# Patient Record
Sex: FEMALE | URBAN - METROPOLITAN AREA
[De-identification: names, ages, dates, MRNs, and addresses within clinical notes are randomized per-mention and may not be internally consistent; named-entity substitution may affect disease eponyms.]

---

## 2020-10-08 ENCOUNTER — TRANSCRIBE ORDER (OUTPATIENT)
Dept: SCHEDULING | Age: 45
End: 2020-10-08

## 2020-10-08 DIAGNOSIS — Z12.31 VISIT FOR SCREENING MAMMOGRAM: Primary | ICD-10-CM

## 2023-05-22 ENCOUNTER — OFFICE VISIT (OUTPATIENT)
Age: 48
End: 2023-05-22
Payer: MEDICARE

## 2023-05-22 VITALS
TEMPERATURE: 98 F | DIASTOLIC BLOOD PRESSURE: 79 MMHG | BODY MASS INDEX: 24.98 KG/M2 | HEART RATE: 68 BPM | HEIGHT: 63 IN | OXYGEN SATURATION: 100 % | WEIGHT: 141 LBS | SYSTOLIC BLOOD PRESSURE: 126 MMHG

## 2023-05-22 DIAGNOSIS — M54.16 LEFT LUMBAR RADICULOPATHY: ICD-10-CM

## 2023-05-22 DIAGNOSIS — M54.16 LEFT LUMBAR RADICULOPATHY: Primary | ICD-10-CM

## 2023-05-22 PROCEDURE — 99204 OFFICE O/P NEW MOD 45 MIN: CPT | Performed by: PHYSICAL MEDICINE & REHABILITATION

## 2023-05-22 RX ORDER — METOPROLOL TARTRATE 50 MG/1
50 TABLET, FILM COATED ORAL 2 TIMES DAILY
COMMUNITY

## 2023-05-22 RX ORDER — PREGABALIN 50 MG/1
50 CAPSULE ORAL
Qty: 30 CAPSULE | Refills: 0 | Status: SHIPPED | OUTPATIENT
Start: 2023-05-22 | End: 2023-06-21

## 2023-05-22 RX ORDER — AMLODIPINE BESYLATE AND ATORVASTATIN CALCIUM 10; 20 MG/1; MG/1
1 TABLET, FILM COATED ORAL DAILY
COMMUNITY

## 2023-05-22 RX ORDER — AMITRIPTYLINE HYDROCHLORIDE 10 MG/1
10 TABLET, FILM COATED ORAL NIGHTLY
COMMUNITY

## 2023-05-22 NOTE — PROGRESS NOTES
[address]  [address], [address]  [address]  Phone: [phone]  Fax: [phone]      Patient: Marian Garcia                                                                              MRN: [id number]        YOB: 1975          AGE: 56 y.o. PCP: PROVIDER MANNIE Pederson  Date:  05/22/23    Reason for Consultation: New Patient, Back Pain, and Transient Ischemic Attack      HPI:  Marian Garcia is a 56 y.o. female with relevant PMH of HTN who presents with left low back pain radiating down the left leg. The pain began 1 months prior. Began after she tripped over her dog.  5/15/23 she went to the ED with numbness tingling in the left leg and  left arm and had to undergo a stroke work up which was negative. 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 [facility]      Neurologic symptoms: No numbness, tingling, weakness, bowel or bladder changes. No recent falls      Location: The pain is located in the low back pain    Radiation: The pain does radiate down the left leg . Pain Score: Currently: 10/10   Quality: Pain is of a aching, burning, tingling, pins and needles, tight, pulling, quality. Aggravating: Pain is exacerbated by walking, sitting, standing, lying down, and exercise  Alleviating: The pain is alleviated by nothing    Prior Treatments:  Physical therapy: Yes- referred has not started   Injections:No  Surgery:No  Previous Medications: flexeril , tizanidine , elavil 10mg- not sure if she has tried   Current Medications:  Previous work-up has included:   [facility] - slight L5/S1 disc space narrowing   Past Medical History: No past medical history on file. Past Surgical History: No past surgical history on file.    SocHx:   Social History     Tobacco Use    Smoking status: Not on file    Smokeless tobacco: Not

## 2023-05-22 NOTE — PROGRESS NOTES
Lilly Cardona presents today for   Chief Complaint   Patient presents with    New Patient    Back Pain    Transient Ischemic Attack       Is someone accompanying this pt? no    Is the patient using any DME equipment during OV? no    Coordination of Care:  1. Have you been to the ER, urgent care clinic since your last visit? no  Hospitalized since your last visit? Yes, TIA    2. Have you seen or consulted any other health care providers outside of the [address] [place] [address] since your last visit? no Include any pap smears or colon screening.  no

## 2023-06-06 ENCOUNTER — TELEPHONE (OUTPATIENT)
Age: 48
End: 2023-06-06

## 2023-06-06 NOTE — TELEPHONE ENCOUNTER
Lvm for pt. To see if she wanted to do a VV with provider to discuss MRI for something sooner. Okay to double book per the provider for VV.

## 2023-06-08 ENCOUNTER — TELEMEDICINE (OUTPATIENT)
Age: 48
End: 2023-06-08
Payer: MEDICARE

## 2023-06-08 DIAGNOSIS — M54.16 LEFT LUMBAR RADICULOPATHY: Primary | ICD-10-CM

## 2023-06-08 PROCEDURE — 99442 PR PHYS/QHP TELEPHONE EVALUATION 11-20 MIN: CPT | Performed by: PHYSICAL MEDICINE & REHABILITATION

## 2023-06-08 RX ORDER — PREGABALIN 100 MG/1
100 CAPSULE ORAL
Qty: 30 CAPSULE | Refills: 0 | Status: SHIPPED | OUTPATIENT
Start: 2023-06-08 | End: 2023-07-08

## 2023-06-08 NOTE — PROGRESS NOTES
[facility] Deepali Pulido was evaluated through a synchronous (real-time) audio encounter. Patient identification was verified at the start of the visit. She (or guardian if applicable) is aware that this is a billable service, which includes applicable co-pays. This visit was conducted with the patient's (and/or legal guardian's) verbal consent. She has not had a related appointment within my department in the past 7 days or scheduled within the next 24 hours. The patient was located at Home: [place] [id number]. The provider was located at [facility] (Appt Dept): [address] [place] [address]. [place] [address].     Note: not billable if this call serves to triage the patient into an appointment for the relevant concern    Shraddha Parikh MD

## 2023-07-06 ENCOUNTER — HOSPITAL ENCOUNTER (OUTPATIENT)
Facility: HOSPITAL | Age: 48
End: 2023-07-06
Payer: MEDICARE

## 2023-07-06 ENCOUNTER — HOSPITAL ENCOUNTER (OUTPATIENT)
Facility: HOSPITAL | Age: 48
Discharge: HOME | End: 2023-07-06
Payer: MEDICARE

## 2023-07-06 VITALS
TEMPERATURE: 98.6 F | RESPIRATION RATE: 12 BRPM | OXYGEN SATURATION: 93 % | HEART RATE: 63 BPM | DIASTOLIC BLOOD PRESSURE: 88 MMHG | SYSTOLIC BLOOD PRESSURE: 156 MMHG

## 2023-07-06 DIAGNOSIS — G89.29 CHRONIC LOW BACK PAIN, UNSPECIFIED BACK PAIN LATERALITY, UNSPECIFIED WHETHER SCIATICA PRESENT: Primary | ICD-10-CM

## 2023-07-06 DIAGNOSIS — M54.50 CHRONIC LOW BACK PAIN, UNSPECIFIED BACK PAIN LATERALITY, UNSPECIFIED WHETHER SCIATICA PRESENT: Primary | ICD-10-CM

## 2023-07-06 PROBLEM — M54.16 LUMBAR RADICULOPATHY: Status: ACTIVE | Noted: 2023-07-06

## 2023-07-06 LAB — HCG UR QL: NEGATIVE

## 2023-07-06 PROCEDURE — 81025 URINE PREGNANCY TEST: CPT

## 2023-07-06 PROCEDURE — 64483 NJX AA&/STRD TFRM EPI L/S 1: CPT

## 2023-07-06 PROCEDURE — 64484 NJX AA&/STRD TFRM EPI L/S EA: CPT

## 2023-07-06 PROCEDURE — 6360000004 HC RX CONTRAST MEDICATION: Performed by: PHYSICAL MEDICINE & REHABILITATION

## 2023-07-06 PROCEDURE — 64484 NJX AA&/STRD TFRM EPI L/S EA: CPT | Performed by: PHYSICAL MEDICINE & REHABILITATION

## 2023-07-06 PROCEDURE — 6370000000 HC RX 637 (ALT 250 FOR IP): Performed by: PHYSICAL MEDICINE & REHABILITATION

## 2023-07-06 PROCEDURE — 6360000002 HC RX W HCPCS: Performed by: PHYSICAL MEDICINE & REHABILITATION

## 2023-07-06 PROCEDURE — 64483 NJX AA&/STRD TFRM EPI L/S 1: CPT | Performed by: PHYSICAL MEDICINE & REHABILITATION

## 2023-07-06 PROCEDURE — 2500000003 HC RX 250 WO HCPCS: Performed by: PHYSICAL MEDICINE & REHABILITATION

## 2023-07-06 RX ORDER — LIDOCAINE HYDROCHLORIDE 10 MG/ML
30 INJECTION, SOLUTION EPIDURAL; INFILTRATION; INTRACAUDAL; PERINEURAL ONCE
Status: COMPLETED | OUTPATIENT
Start: 2023-07-06 | End: 2023-07-06

## 2023-07-06 RX ORDER — DIAZEPAM 5 MG/1
10 TABLET ORAL ONCE
Status: COMPLETED | OUTPATIENT
Start: 2023-07-06 | End: 2023-07-06

## 2023-07-06 RX ORDER — METOPROLOL SUCCINATE 50 MG/1
50 TABLET, EXTENDED RELEASE ORAL DAILY
COMMUNITY
Start: 2023-06-23 | End: 2023-07-06

## 2023-07-06 RX ORDER — DIAZEPAM 5 MG/1
5 TABLET ORAL ONCE
Status: COMPLETED | OUTPATIENT
Start: 2023-07-06 | End: 2023-07-06

## 2023-07-06 RX ORDER — DIAZEPAM 5 MG/1
2.5 TABLET ORAL ONCE
Status: COMPLETED | OUTPATIENT
Start: 2023-07-06 | End: 2023-07-06

## 2023-07-06 RX ORDER — FAMOTIDINE 40 MG/1
40 TABLET, FILM COATED ORAL 2 TIMES DAILY
COMMUNITY
Start: 2023-06-23 | End: 2023-07-06

## 2023-07-06 RX ORDER — DEXAMETHASONE SODIUM PHOSPHATE 10 MG/ML
10 INJECTION, SOLUTION INTRAMUSCULAR; INTRAVENOUS ONCE
Status: COMPLETED | OUTPATIENT
Start: 2023-07-06 | End: 2023-07-06

## 2023-07-06 RX ORDER — RIZATRIPTAN BENZOATE 10 MG/1
10 TABLET ORAL 2 TIMES DAILY
COMMUNITY
Start: 2023-06-23

## 2023-07-06 RX ORDER — AMLODIPINE BESYLATE 5 MG/1
5 TABLET ORAL DAILY
COMMUNITY
Start: 2023-05-11 | End: 2023-07-06

## 2023-07-06 RX ADMIN — IOPAMIDOL 1 ML: 408 INJECTION, SOLUTION INTRATHECAL at 13:29

## 2023-07-06 RX ADMIN — LIDOCAINE HYDROCHLORIDE 10 ML: 10 INJECTION, SOLUTION EPIDURAL; INFILTRATION; INTRACAUDAL; PERINEURAL at 13:28

## 2023-07-06 RX ADMIN — DIAZEPAM 5 MG: 5 TABLET ORAL at 12:47

## 2023-07-06 RX ADMIN — DEXAMETHASONE SODIUM PHOSPHATE 10 MG: 10 INJECTION, SOLUTION INTRAMUSCULAR; INTRAVENOUS at 13:31

## 2023-07-06 ASSESSMENT — PAIN - FUNCTIONAL ASSESSMENT
PAIN_FUNCTIONAL_ASSESSMENT: 0-10
PAIN_FUNCTIONAL_ASSESSMENT: 0-10

## 2023-07-06 NOTE — OP NOTE
PROCEDURE NOTE      Patient Name: Helena Henry    Date of Procedure: July 6, 2023    Preoperative Diagnosis:  M54.16    Post Operative Diagnosis:  M54.16    Procedure :    left L5 Selective Nerve Root Block  left S1 Selective Nerve Root Block      Consent:  Informed consent was obtained prior to the procedure. The patient was given the opportunity to ask questions regarding the procedure and its associated risks. In addition to the potential risks associated with the procedure itself, the patient was informed both verbally and in writing of the potential side effects of the use of glucocorticoid. The patient appeared to comprehend the informed consent and desired to have the procedure performed. Procedure: The patient was placed in the prone position on the fluoroscopy table and the back was prepped and draped in the usual sterile manner. The exact spinal level was  identified using fluoroscopy, and Lidocaine 1 % was injected locally, a # 22 gauge spinal needle was passed to the transverse process. The depth was noted and the needle redirected to pass inferior and approximately one cm anterior to the transverse process. Yes  1 cc of Isovue M-200 was used to verify positioning in the epidural and paravertebral space and outlined the course of the spinal nerve into the epidural space. The same procedure was repeated at each spinal level indicated above. A total of 10 mg of preservative free Dexamethasone and 2 cc of Lidocaine was slowly injected. The patient tolerated the procedure well. The injection area was cleaned and bandaids applied. Not excessive bleeding was noted. Patient dressed and discharged to home with instructions. Discussion: The patient tolerated the procedure well.                                               Sourav Jolley MD  July 6, 2023

## 2023-07-07 ENCOUNTER — TELEPHONE (OUTPATIENT)
Age: 48
End: 2023-07-07

## 2023-07-07 DIAGNOSIS — M54.16 LEFT LUMBAR RADICULOPATHY: ICD-10-CM

## 2023-07-07 RX ORDER — PREGABALIN 100 MG/1
100 CAPSULE ORAL
Qty: 30 CAPSULE | Refills: 0 | Status: SHIPPED | OUTPATIENT
Start: 2023-07-07 | End: 2023-08-06

## 2023-07-07 NOTE — TELEPHONE ENCOUNTER
Patient is requesting a refill of her medication Lyrica. She uses [facility] pharmacy at [address]

## 2023-07-07 NOTE — TELEPHONE ENCOUNTER
Called patient [phone] and left voice mail asking her to call the office back. I was calling to inform her that the provider has sent in a the Lyrica 100 mg take one pill at night to her [facility] pharmacy. No further action needed at this time.

## 2023-07-27 ENCOUNTER — OFFICE VISIT (OUTPATIENT)
Age: 48
End: 2023-07-27
Payer: MEDICARE

## 2023-07-27 VITALS
BODY MASS INDEX: 25.59 KG/M2 | TEMPERATURE: 98 F | HEART RATE: 60 BPM | OXYGEN SATURATION: 99 % | WEIGHT: 144.4 LBS | HEIGHT: 63 IN

## 2023-07-27 DIAGNOSIS — M54.2 NECK PAIN: Primary | ICD-10-CM

## 2023-07-27 DIAGNOSIS — M54.16 LEFT LUMBAR RADICULOPATHY: ICD-10-CM

## 2023-07-27 PROCEDURE — 99214 OFFICE O/P EST MOD 30 MIN: CPT | Performed by: PHYSICAL MEDICINE & REHABILITATION

## 2023-07-27 RX ORDER — PREGABALIN 100 MG/1
100 CAPSULE ORAL
Qty: 90 CAPSULE | Refills: 0 | Status: SHIPPED | OUTPATIENT
Start: 2023-07-27 | End: 2023-10-25

## 2023-07-27 ASSESSMENT — PATIENT HEALTH QUESTIONNAIRE - PHQ9
2. FEELING DOWN, DEPRESSED OR HOPELESS: 0
1. LITTLE INTEREST OR PLEASURE IN DOING THINGS: 0
SUM OF ALL RESPONSES TO PHQ QUESTIONS 1-9: 0
SUM OF ALL RESPONSES TO PHQ QUESTIONS 1-9: 0
SUM OF ALL RESPONSES TO PHQ9 QUESTIONS 1 & 2: 0
SUM OF ALL RESPONSES TO PHQ QUESTIONS 1-9: 0
SUM OF ALL RESPONSES TO PHQ QUESTIONS 1-9: 0

## 2023-07-27 NOTE — PROGRESS NOTES
Konstantin Delacruz presents today for   Chief Complaint   Patient presents with    Back Pain       Is someone accompanying this pt? no    Is the patient using any DME equipment during OV? no    Depression Screening:  No flowsheet data found. Learning Assessment:  No flowsheet data found. Abuse Screening:  No flowsheet data found. Fall Risk  No flowsheet data found. OPIOID RISK TOOL  No flowsheet data found. Coordination of Care:  1. Have you been to the ER, urgent care clinic since your last visit? no  Hospitalized since your last visit? no    2. Have you seen or consulted any other health care providers outside of the [address] [place] [address] since your last visit? no Include any pap smears or colon screening.  no
Facility (Appt Dept): [facility], [address],  [address] [place] [address].     Note: not billable if this call serves to triage the patient into an appointment for the relevant concern    Owen Jenkins MD

## 2023-10-26 ENCOUNTER — OFFICE VISIT (OUTPATIENT)
Age: 48
End: 2023-10-26
Payer: MEDICARE

## 2023-10-26 VITALS — BODY MASS INDEX: 25.59 KG/M2 | HEIGHT: 63 IN | OXYGEN SATURATION: 98 % | WEIGHT: 144.4 LBS | HEART RATE: 75 BPM

## 2023-10-26 DIAGNOSIS — M54.16 LEFT LUMBAR RADICULOPATHY: Primary | ICD-10-CM

## 2023-10-26 PROCEDURE — 99214 OFFICE O/P EST MOD 30 MIN: CPT | Performed by: PHYSICAL MEDICINE & REHABILITATION

## 2023-10-26 RX ORDER — CHOLECALCIFEROL (VITAMIN D3) 125 MCG
500 CAPSULE ORAL
COMMUNITY
Start: 2023-10-11

## 2023-10-26 RX ORDER — PREGABALIN 100 MG/1
100 CAPSULE ORAL
Qty: 90 CAPSULE | Refills: 0 | Status: SHIPPED | OUTPATIENT
Start: 2023-10-26 | End: 2024-01-24

## 2023-10-26 RX ORDER — METOPROLOL SUCCINATE 50 MG/1
50 TABLET, EXTENDED RELEASE ORAL DAILY
COMMUNITY
Start: 2023-09-12

## 2023-10-26 RX ORDER — AMLODIPINE BESYLATE 5 MG/1
5 TABLET ORAL DAILY
COMMUNITY
Start: 2023-08-11

## 2023-10-26 ASSESSMENT — PATIENT HEALTH QUESTIONNAIRE - PHQ9
1. LITTLE INTEREST OR PLEASURE IN DOING THINGS: 0
SUM OF ALL RESPONSES TO PHQ QUESTIONS 1-9: 0
SUM OF ALL RESPONSES TO PHQ QUESTIONS 1-9: 0
2. FEELING DOWN, DEPRESSED OR HOPELESS: 0
SUM OF ALL RESPONSES TO PHQ9 QUESTIONS 1 & 2: 0
SUM OF ALL RESPONSES TO PHQ QUESTIONS 1-9: 0
SUM OF ALL RESPONSES TO PHQ QUESTIONS 1-9: 0

## 2023-10-26 NOTE — PROGRESS NOTES
[facility]  [address] [place] [address] S, [address] [place] [address]   Phone: [phone]  Fax: [phone]      Patient: Shiela Hayes                                                                              MRN: [id number]        YOB: 1975          AGE: 56 y.o. PCP: Unknown, Provider  Date:  10/26/23    Reason for Consultation back pain radiating down the left leg      HPI:  Shiela Hayes is a 56 y.o. female with relevant PMH of HTN who presented with left low back pain radiating down the left leg. She reports chronic low back pain,and she developed pain radiating down her left leg she tripped over her dog.  5/15/23 she went to the ED with numbness tingling in the left leg and  left arm and had to undergo a stroke work up which was negative. 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 MRI at [facility] 6/1/23  diagnostic demonstrates large left disc extrusion displacing the left S1 nerve root. She tried  left L5 and S1 SNRB 7/6/2023 which has helped tremendously. She is currently in PT. Some pain has started to return radiating down the left leg  Neurologic symptoms: + numbness, tingling- left leg, weakness, bowel or bladder changes. No recent falls      Location: The pain is located in the low back pain  , neck pain   Radiation: The pain does radiate down the left leg . Pain Score: Currently: 4/10   Quality: Pain is of a aching, burning, tingling, pins and needles, tight, pulling, quality. Aggravating: Pain is exacerbated by walking, sitting, standing, lying down, and exercise  Alleviating:  The pain is alleviated by nothing    Prior Treatments:  Physical therapy: Yes [facility] [address]  Injections:  7/6/2023- left l5, S1 SNRB- great relief - 80% relief   Surgery:No  Previous Medications: flexeril , tizanidine , elavil 10mg- not sure if she has tried   Current

## 2023-10-26 NOTE — PROGRESS NOTES
Jhoan Carmichael presents today for   Chief Complaint   Patient presents with    Back Pain       Is someone accompanying this pt? no    Is the patient using any DME equipment during OV? no    Depression Screening:       No data to display                Learning Assessment:  No flowsheet data found. Abuse Screening:       No data to display                Fall Risk  No flowsheet data found. OPIOID RISK TOOL  No flowsheet data found. Coordination of Care:  1. Have you been to the ER, urgent care clinic since your last visit? Yes covid 3 weeks ago  Hospitalized since your last visit? no    2. Have you seen or consulted any other health care providers outside of the [address] [place] [address] since your last visit? no Include any pap smears or colon screening.  no

## 2023-11-02 ENCOUNTER — HOSPITAL ENCOUNTER (OUTPATIENT)
Facility: HOSPITAL | Age: 48
Discharge: HOME | End: 2023-11-02
Payer: MEDICAID

## 2023-11-02 ENCOUNTER — HOSPITAL ENCOUNTER (OUTPATIENT)
Facility: HOSPITAL | Age: 48
End: 2023-11-02
Payer: MEDICAID

## 2023-11-02 VITALS
DIASTOLIC BLOOD PRESSURE: 91 MMHG | HEART RATE: 65 BPM | OXYGEN SATURATION: 95 % | TEMPERATURE: 97.4 F | SYSTOLIC BLOOD PRESSURE: 122 MMHG | RESPIRATION RATE: 16 BRPM

## 2023-11-02 DIAGNOSIS — Z01.818 PREOP TESTING: Primary | ICD-10-CM

## 2023-11-02 LAB — HCG UR QL: NEGATIVE

## 2023-11-02 PROCEDURE — 64483 NJX AA&/STRD TFRM EPI L/S 1: CPT | Performed by: PHYSICAL MEDICINE & REHABILITATION

## 2023-11-02 PROCEDURE — 6370000000 HC RX 637 (ALT 250 FOR IP): Performed by: PHYSICAL MEDICINE & REHABILITATION

## 2023-11-02 PROCEDURE — 64483 NJX AA&/STRD TFRM EPI L/S 1: CPT

## 2023-11-02 PROCEDURE — 6360000002 HC RX W HCPCS: Performed by: PHYSICAL MEDICINE & REHABILITATION

## 2023-11-02 PROCEDURE — 81025 URINE PREGNANCY TEST: CPT

## 2023-11-02 PROCEDURE — 64484 NJX AA&/STRD TFRM EPI L/S EA: CPT

## 2023-11-02 PROCEDURE — 64484 NJX AA&/STRD TFRM EPI L/S EA: CPT | Performed by: PHYSICAL MEDICINE & REHABILITATION

## 2023-11-02 PROCEDURE — 6360000004 HC RX CONTRAST MEDICATION: Performed by: PHYSICAL MEDICINE & REHABILITATION

## 2023-11-02 PROCEDURE — 2500000003 HC RX 250 WO HCPCS: Performed by: PHYSICAL MEDICINE & REHABILITATION

## 2023-11-02 RX ORDER — DIAZEPAM 5 MG/1
2.5 TABLET ORAL ONCE
Status: COMPLETED | OUTPATIENT
Start: 2023-11-02 | End: 2023-11-02

## 2023-11-02 RX ORDER — DEXAMETHASONE SODIUM PHOSPHATE 10 MG/ML
10 INJECTION, SOLUTION INTRAMUSCULAR; INTRAVENOUS ONCE
Status: COMPLETED | OUTPATIENT
Start: 2023-11-02 | End: 2023-11-02

## 2023-11-02 RX ORDER — DIAZEPAM 5 MG/1
10 TABLET ORAL ONCE
Status: COMPLETED | OUTPATIENT
Start: 2023-11-02 | End: 2023-11-02

## 2023-11-02 RX ORDER — IOPAMIDOL 408 MG/ML
4 INJECTION, SOLUTION INTRATHECAL
Status: COMPLETED | OUTPATIENT
Start: 2023-11-02 | End: 2023-11-02

## 2023-11-02 RX ORDER — DIAZEPAM 5 MG/1
5 TABLET ORAL ONCE
Status: COMPLETED | OUTPATIENT
Start: 2023-11-02 | End: 2023-11-02

## 2023-11-02 RX ORDER — LIDOCAINE HYDROCHLORIDE 10 MG/ML
30 INJECTION, SOLUTION EPIDURAL; INFILTRATION; INTRACAUDAL; PERINEURAL ONCE
Status: COMPLETED | OUTPATIENT
Start: 2023-11-02 | End: 2023-11-02

## 2023-11-02 RX ADMIN — IOPAMIDOL 1 ML: 408 INJECTION, SOLUTION INTRATHECAL at 14:03

## 2023-11-02 RX ADMIN — DEXAMETHASONE SODIUM PHOSPHATE 10 MG: 10 INJECTION, SOLUTION INTRAMUSCULAR; INTRAVENOUS at 14:04

## 2023-11-02 RX ADMIN — LIDOCAINE HYDROCHLORIDE 12 ML: 10 INJECTION, SOLUTION EPIDURAL; INFILTRATION; INTRACAUDAL; PERINEURAL at 14:02

## 2023-11-02 RX ADMIN — DIAZEPAM 5 MG: 5 TABLET ORAL at 13:24

## 2023-11-02 ASSESSMENT — PAIN SCALES - GENERAL: PAINLEVEL_OUTOF10: 2

## 2023-11-02 ASSESSMENT — PAIN - FUNCTIONAL ASSESSMENT: PAIN_FUNCTIONAL_ASSESSMENT: 0-10

## 2023-11-02 NOTE — OP NOTE
PROCEDURE NOTE      Patient Name: Shiela Hayes    Date of Procedure: November 2, 2023    Preoperative Diagnosis:  M54.16    Post Operative Diagnosis:  same  Procedure :    left L5 Selective Nerve Root Block  left S1 Selective Nerve Root Block      Consent:  Informed consent was obtained prior to the procedure. The patient was given the opportunity to ask questions regarding the procedure and its associated risks. In addition to the potential risks associated with the procedure itself, the patient was informed both verbally and in writing of the potential side effects of the use of glucocorticoid. The patient appeared to comprehend the informed consent and desired to have the procedure performed. Procedure: The patient was placed in the prone position on the fluoroscopy table and the back was prepped and draped in the usual sterile manner. The exact spinal level was  identified using fluoroscopy, and Lidocaine 1 % was injected locally, a # 22 gauge spinal needle was passed to the transverse process. The depth was noted and the needle redirected to pass inferior and approximately one cm anterior to the transverse process. Yes  1 cc of Isovue M-200 was used to verify positioning in the epidural and paravertebral space and outlined the course of the spinal nerve into the epidural space. The same procedure was repeated at each spinal level indicated above. A total of 10 mg of preservative free Dexamethasone and 2 cc of Lidocaine was slowly injected. The patient tolerated the procedure well. The injection area was cleaned and bandaids applied. Not excessive bleeding was noted. Patient dressed and discharged to home with instructions. Discussion: The patient tolerated the procedure well.                                               Cosme Yusuf MD  November 2, 2023

## 2023-12-02 PROBLEM — Z01.818 PREOP TESTING: Status: RESOLVED | Noted: 2023-11-02 | Resolved: 2023-12-02

## 2024-01-18 ENCOUNTER — TELEPHONE (OUTPATIENT)
Age: 49
End: 2024-01-18

## 2024-01-18 NOTE — TELEPHONE ENCOUNTER
The issue with her foot is likely coming from the pinched nerve in her lumbar spine causing weakness in her foot.  Has she seen Dr. Syed yet?

## 2024-01-18 NOTE — TELEPHONE ENCOUNTER
Patient says her physical therapist is recommending another MRI of her foot and was told to call us and request the same.  Please review to determine if we can order this now or does patient need to be seen first.  Last MRI was for lumbar in 5/2023.

## 2024-01-19 NOTE — TELEPHONE ENCOUNTER
Called patient identified by two verifiers. Explained The issue with her foot is likely coming from the pinched nerve in her lumbar spine causing weakness in her foot. Has she seen Dr. Syed yet? Patient stated no she hasn't she works now and she doesn't want surgery. She stated she had conversation with provider and she would like another MRI for comparison. Please advise

## 2024-01-24 ENCOUNTER — TELEMEDICINE (OUTPATIENT)
Age: 49
End: 2024-01-24
Payer: MEDICAID

## 2024-01-24 DIAGNOSIS — M54.16 LEFT LUMBAR RADICULOPATHY: Primary | ICD-10-CM

## 2024-01-24 PROCEDURE — 99442 PR PHYS/QHP TELEPHONE EVALUATION 11-20 MIN: CPT | Performed by: PHYSICAL MEDICINE & REHABILITATION

## 2024-01-24 RX ORDER — ATORVASTATIN CALCIUM 10 MG/1
10 TABLET, FILM COATED ORAL DAILY
COMMUNITY
Start: 2023-11-10

## 2024-01-24 RX ORDER — IBUPROFEN 800 MG/1
TABLET ORAL
COMMUNITY
Start: 2024-01-09

## 2024-01-24 RX ORDER — AMLODIPINE BESYLATE 10 MG/1
10 TABLET ORAL
COMMUNITY
Start: 2024-01-09 | End: 2024-04-08

## 2024-01-24 NOTE — PROGRESS NOTES
[facility]  [facility], [address]  [place] [address]  Phone: [phone]  Fax: [phone]      Patient: Yolanda Oquendo                                                                              MRN: [id number]        YOB: 1975          AGE: 48 y.o.             PCP: Vika Calhoun APRN - NP  Date:  01/24/24    Reason for Consultation back pain radiating down the left leg      HPI:  Yolanda Oquendo is a 48 y.o. female with relevant PMH of HTN who presented with left low back pain radiating down the left leg.  She reports chronic low back pain,and she developed pain radiating down her left leg she tripped over her dog.  5/15/23 she went to the ED with numbness tingling in the left leg and  left arm and had to undergo a stroke work up which was negative. 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 MRI at [facility] 6/1/23  diagnostic demonstrates large left disc extrusion displacing the left S1 nerve root. She tried  left L5 and S1 SNRB 7/6/2023 which has helped tremendously. She is currently in PT. Some pain has started to return radiating down the left leg.  She was last seen 10/2023. She thinks her symptoms are improving.  She did not go see the spine surgery.  She does not want to have surgery.    Neurologic symptoms: + numbness, tingling- left leg, weakness, bowel or bladder changes.  No recent falls      Location: The pain is located in the low back pain  , neck pain   Radiation: The pain does radiate down the left leg .    Pain Score: Currently: 6/10   Quality: Pain is of a aching, burning, tingling, pins and needles, tight, pulling, quality.    Aggravating: Pain is exacerbated by walking, sitting, standing, lying down, and exercise  Alleviating: The pain is alleviated by nothing    Prior Treatments:  Physical therapy: Yes [facility] [place]  Injections:  7/6/2023-  Implemented All Universal Safety Interventions:  [place] to call system. Call bell, personal items and telephone within reach. Instruct patient to call for assistance. Room bathroom lighting operational. Non-slip footwear when patient is off stretcher. Physically safe environment: no spills, clutter or unnecessary equipment. Stretcher in lowest position, wheels locked, appropriate side rails in place.

## 2024-01-24 NOTE — PROGRESS NOTES
Yolanda Oquendo presents today for   Chief Complaint   Patient presents with    Back Problem    Pain    Back Pain       Is someone accompanying this pt? no    Is the patient using any DME equipment during OV? no    Depression Screening:       No data to display                Learning Assessment:      Abuse Screening:       No data to display                Fall Risk      OPIOID RISK TOOL      Coordination of Care:  1. Have you been to the ER, urgent care clinic since your last visit? no  Hospitalized since your last visit? no    2. Have you seen or consulted any other health care providers outside of the [facility] since your last visit? no Include any pap smears or colon screening. no

## 2024-03-07 ENCOUNTER — TELEPHONE (OUTPATIENT)
Age: 49
End: 2024-03-07

## 2024-03-07 RX ORDER — IBUPROFEN 800 MG/1
800 TABLET ORAL DAILY PRN
Qty: 30 TABLET | Refills: 0 | Status: SHIPPED | OUTPATIENT
Start: 2024-03-07 | End: 2024-04-06

## 2024-03-07 NOTE — TELEPHONE ENCOUNTER
Patient called to request a refill of ibuprofen (ADVIL;MOTRIN) 800 MG tablet  be sent to [facility] on [facility] in [place]. States she needs something to take during the day while she is working to help with her pain alongside the pregabalin she is currently taking.    Please review and advise patient, [phone]

## 2024-03-08 NOTE — TELEPHONE ENCOUNTER
I tried calling the pt to discuss and there was no answer. I Left a message on the machine with the offices phone number and my name asking for a call back.

## 2024-03-08 NOTE — TELEPHONE ENCOUNTER
I tried calling the pt and there was no answer. I left a message on the machine with the office phone number and my number to call back and discuss.

## 2024-03-26 ENCOUNTER — TELEPHONE (OUTPATIENT)
Age: 49
End: 2024-03-26

## 2024-03-26 NOTE — TELEPHONE ENCOUNTER
She will most likely need an updated visit- I can try to add her 3/28/2024 for an in person or virtual visit

## 2024-03-26 NOTE — TELEPHONE ENCOUNTER
Patient called for Dr. Neil.     Patient would like to know if she can get another Spinal Block injection. Patient received the last one on 11/02/23 from .    Patient tel. [phone].

## 2024-03-27 NOTE — TELEPHONE ENCOUNTER
I called and spoke to the pt. The pt was identified using 2 pt identifiers. She was offered an appt with Dr. Neil to discuss her request. She was asked if she wanted an in person or telephone visit. The pt chose telephone. She was offered the next available appt tomorrow afternoon at 1645. The pt accepted the appt. I explained how the telephone visit process works and that they may call her earlier than her appt time. She verbalized understanding and has no questions or concerns at this time.

## 2024-04-09 RX ORDER — IBUPROFEN 800 MG/1
800 TABLET ORAL DAILY PRN
Qty: 30 TABLET | Refills: 0 | Status: SHIPPED | OUTPATIENT
Start: 2024-04-09

## 2024-04-11 ENCOUNTER — TELEMEDICINE (OUTPATIENT)
Age: 49
End: 2024-04-11
Payer: MEDICAID

## 2024-04-11 DIAGNOSIS — M54.16 LEFT LUMBAR RADICULOPATHY: Primary | ICD-10-CM

## 2024-04-11 PROCEDURE — 99442 PR PHYS/QHP TELEPHONE EVALUATION 11-20 MIN: CPT | Performed by: PHYSICAL MEDICINE & REHABILITATION

## 2024-04-11 NOTE — PROGRESS NOTES
[facility]  [facility], [address]  [place] [address]  Phone: [phone]  Fax: [phone]      Patient: Yolanda Oquendo                                                                              MRN: [id number]        YOB: 1975          AGE: 48 y.o.             PCP: Vika Calhoun APRN - NP  Date:  04/11/24    Reason for Consultation back pain radiating down the left leg      HPI:  Yolanda Oquendo is a 48 y.o. female with relevant PMH of HTN who presented with left low back pain radiating down the left leg.  She reports chronic low back pain,and she developed pain radiating down her left leg she tripped over her dog.  5/15/23 she went to the ED with numbness tingling in the left leg and  left arm and had to undergo a stroke work up which was negative. 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 MRI at [facility] 6/1/23  diagnostic demonstrates large left disc extrusion displacing the left S1 nerve root. She tried  left L5 and S1 SNRB 7/6/2023 which has helped tremendously and 11/2/2023 she had another injection which helped.  . Some pain has started to return radiating down the left leg.  She is currently in PT and would like to have another injection     I had referred her to see Dr. Syed 10/2023 but she did not go at the time, now she would like a new referral      Neurologic symptoms: + numbness, tingling- left leg, weakness, bowel or bladder changes.  No recent falls      Location: The pain is located in the low back pain  , neck pain   Radiation: The pain does radiate down the left leg .    Pain Score: Currently:4/10   Quality: Pain is of a aching, burning, tingling, pins and needles, tight, pulling, quality.    Aggravating: Pain is exacerbated by walking, sitting, standing, lying down, and exercise  Alleviating: The pain is alleviated by nothing    Prior

## 2024-04-26 ENCOUNTER — CLINICAL DOCUMENTATION (OUTPATIENT)
Age: 49
End: 2024-04-26

## 2024-04-26 NOTE — PROGRESS NOTES
Pt's block is scheduled for 5/2/2024, Patient stated that she would need to call back to make a follow up appt because she is in school

## 2024-05-02 ENCOUNTER — HOSPITAL ENCOUNTER (OUTPATIENT)
Facility: HOSPITAL | Age: 49
End: 2024-05-02
Payer: MEDICAID

## 2024-05-02 VITALS
RESPIRATION RATE: 16 BRPM | DIASTOLIC BLOOD PRESSURE: 77 MMHG | OXYGEN SATURATION: 100 % | TEMPERATURE: 99.3 F | SYSTOLIC BLOOD PRESSURE: 126 MMHG | HEART RATE: 62 BPM

## 2024-05-02 LAB — HCG UR QL: NEGATIVE

## 2024-05-02 PROCEDURE — 64483 NJX AA&/STRD TFRM EPI L/S 1: CPT | Performed by: PHYSICAL MEDICINE & REHABILITATION

## 2024-05-02 PROCEDURE — 64483 NJX AA&/STRD TFRM EPI L/S 1: CPT

## 2024-05-02 PROCEDURE — 6360000002 HC RX W HCPCS: Performed by: PHYSICAL MEDICINE & REHABILITATION

## 2024-05-02 PROCEDURE — 6370000000 HC RX 637 (ALT 250 FOR IP): Performed by: PHYSICAL MEDICINE & REHABILITATION

## 2024-05-02 PROCEDURE — 64484 NJX AA&/STRD TFRM EPI L/S EA: CPT

## 2024-05-02 PROCEDURE — 81025 URINE PREGNANCY TEST: CPT

## 2024-05-02 PROCEDURE — 2500000003 HC RX 250 WO HCPCS: Performed by: PHYSICAL MEDICINE & REHABILITATION

## 2024-05-02 PROCEDURE — 64484 NJX AA&/STRD TFRM EPI L/S EA: CPT | Performed by: PHYSICAL MEDICINE & REHABILITATION

## 2024-05-02 PROCEDURE — 6360000004 HC RX CONTRAST MEDICATION: Performed by: PHYSICAL MEDICINE & REHABILITATION

## 2024-05-02 RX ORDER — CHOLECALCIFEROL (VITAMIN D3) 1250 MCG
CAPSULE ORAL
COMMUNITY

## 2024-05-02 RX ORDER — DEXAMETHASONE SODIUM PHOSPHATE 10 MG/ML
10 INJECTION, SOLUTION INTRAMUSCULAR; INTRAVENOUS ONCE
Status: COMPLETED | OUTPATIENT
Start: 2024-05-02 | End: 2024-05-02

## 2024-05-02 RX ORDER — DIAZEPAM 5 MG/1
5 TABLET ORAL ONCE
Status: COMPLETED | OUTPATIENT
Start: 2024-05-02 | End: 2024-05-02

## 2024-05-02 RX ORDER — LIDOCAINE HYDROCHLORIDE 10 MG/ML
30 INJECTION, SOLUTION EPIDURAL; INFILTRATION; INTRACAUDAL; PERINEURAL ONCE
Status: COMPLETED | OUTPATIENT
Start: 2024-05-02 | End: 2024-05-02

## 2024-05-02 RX ORDER — DIAZEPAM 5 MG/1
2.5 TABLET ORAL ONCE
Status: COMPLETED | OUTPATIENT
Start: 2024-05-02 | End: 2024-05-02

## 2024-05-02 RX ORDER — DIAZEPAM 5 MG/1
10 TABLET ORAL ONCE
Status: COMPLETED | OUTPATIENT
Start: 2024-05-02 | End: 2024-05-02

## 2024-05-02 RX ORDER — IOPAMIDOL 408 MG/ML
4 INJECTION, SOLUTION INTRATHECAL
Status: COMPLETED | OUTPATIENT
Start: 2024-05-02 | End: 2024-05-02

## 2024-05-02 RX ADMIN — DEXAMETHASONE SODIUM PHOSPHATE 10 MG: 10 INJECTION INTRAMUSCULAR; INTRAVENOUS at 13:31

## 2024-05-02 RX ADMIN — DIAZEPAM 2.5 MG: 5 TABLET ORAL at 12:48

## 2024-05-02 RX ADMIN — LIDOCAINE HYDROCHLORIDE 11 ML: 10 INJECTION, SOLUTION EPIDURAL; INFILTRATION; INTRACAUDAL; PERINEURAL at 13:30

## 2024-05-02 RX ADMIN — IOPAMIDOL 1 ML: 408 INJECTION, SOLUTION INTRATHECAL at 13:31

## 2024-05-02 ASSESSMENT — PAIN SCALES - GENERAL: PAINLEVEL_OUTOF10: 0

## 2024-05-02 ASSESSMENT — PAIN DESCRIPTION - DESCRIPTORS: DESCRIPTORS: TINGLING;NUMBNESS

## 2024-05-02 ASSESSMENT — PAIN - FUNCTIONAL ASSESSMENT: PAIN_FUNCTIONAL_ASSESSMENT: 0-10

## 2024-05-02 NOTE — OP NOTE
PROCEDURE NOTE      Patient Name: Yolanda Oquendo    Date of Procedure: May 2, 2024    Preoperative Diagnosis:  M54.16    Post Operative Diagnosis:  same    Procedure :    left L5 Selective Nerve Root Block  left S1 Selective Nerve Root Block      Consent:  Informed consent was obtained prior to the procedure.  The patient was given the opportunity to ask questions regarding the procedure and its associated risks.  In addition to the potential risks associated with the procedure itself, the patient was informed both verbally and in writing of the potential side effects of the use of glucocorticoid.  The patient appeared to comprehend the informed consent and desired to have the procedure performed.        Procedure:  The patient was placed in the prone position on the fluoroscopy table and the back was prepped and draped in the usual sterile manner.  The exact spinal level was  identified using fluoroscopy, and Lidocaine 1 % was injected locally, a # 22 gauge spinal needle was passed to the transverse process.  The depth was noted and the needle redirected to pass inferior and approximately one cm anterior to the transverse process.    Yes  1 cc of Isovue M-200 was used to verify positioning in the epidural and paravertebral space and outlined the course of the spinal nerve into the epidural space.  The same procedure was repeated at each spinal level indicated above.    A total of 10 mg of preservative free Dexamethasone and 2 cc of Lidocaine was slowly injected.   The patient tolerated the procedure well.  The injection area was cleaned and bandaids applied.  Not excessive bleeding was noted.   Patient dressed and discharged to home with instructions.    Discussion: The patient tolerated the procedure well.                                              HALIE GARCIA III, MD  May 2, 2024

## 2024-06-04 ENCOUNTER — TELEPHONE (OUTPATIENT)
Age: 49
End: 2024-06-04

## 2024-06-04 DIAGNOSIS — M54.16 LEFT LUMBAR RADICULOPATHY: ICD-10-CM

## 2024-06-04 NOTE — TELEPHONE ENCOUNTER
Patient called and is asking for a refill on the Lyrica medication from .    [facility] on [address] in [place]  Tel. [phone]    Patient tel. [phone].

## 2024-06-06 RX ORDER — PREGABALIN 100 MG/1
100 CAPSULE ORAL
Qty: 30 CAPSULE | Refills: 0 | Status: SHIPPED | OUTPATIENT
Start: 2024-06-06 | End: 2024-07-06

## 2024-06-06 NOTE — TELEPHONE ENCOUNTER
Attempted to contact the pt regarding her request. She was not able to be reached. A message was left for the pt asking her to contact the office about her recent message. The number to the office was provided. The pt will need to make an in person visit with Dr. Neil.

## 2024-07-15 RX ORDER — IBUPROFEN 800 MG/1
800 TABLET, FILM COATED ORAL DAILY PRN
Qty: 30 TABLET | Refills: 0 | OUTPATIENT
Start: 2024-07-15

## 2024-07-16 ENCOUNTER — TELEPHONE (OUTPATIENT)
Age: 49
End: 2024-07-16

## 2024-07-16 RX ORDER — IBUPROFEN 800 MG/1
800 TABLET ORAL DAILY PRN
Qty: 30 TABLET | Refills: 0 | Status: SHIPPED | OUTPATIENT
Start: 2024-07-16

## 2024-07-16 NOTE — TELEPHONE ENCOUNTER
Returned patients call   She had appointment with Dr. Syed and is awaiting an MRI for possible surgery     She is taking lyrica 300mg daily    She would like refill of ibuprofen 800mg- sent to pharmacy

## 2024-07-16 NOTE — TELEPHONE ENCOUNTER
Patient called this morning stating she is having some new and different pain that she would like to talk to Dr. Neil about if possible. She also states she has seen Kunal Yuan and is waiting for approval for a second MRI before considering surgery, just so she is aware.    Please review and advise patient, [phone]

## 2024-09-30 RX ORDER — IBUPROFEN 800 MG/1
800 TABLET, FILM COATED ORAL DAILY PRN
Qty: 30 TABLET | Refills: 0 | Status: SHIPPED | OUTPATIENT
Start: 2024-09-30

## 2024-10-07 ENCOUNTER — OFFICE VISIT (OUTPATIENT)
Age: 49
End: 2024-10-07
Payer: MEDICAID

## 2024-10-07 VITALS
DIASTOLIC BLOOD PRESSURE: 67 MMHG | SYSTOLIC BLOOD PRESSURE: 107 MMHG | OXYGEN SATURATION: 99 % | WEIGHT: 136 LBS | HEIGHT: 63 IN | BODY MASS INDEX: 24.1 KG/M2 | TEMPERATURE: 98.1 F | HEART RATE: 77 BPM

## 2024-10-07 DIAGNOSIS — M54.16 LEFT LUMBAR RADICULOPATHY: Primary | ICD-10-CM

## 2024-10-07 PROCEDURE — 72170 X-RAY EXAM OF PELVIS: CPT | Performed by: PHYSICAL MEDICINE & REHABILITATION

## 2024-10-07 PROCEDURE — 99214 OFFICE O/P EST MOD 30 MIN: CPT | Performed by: PHYSICAL MEDICINE & REHABILITATION

## 2024-10-07 PROCEDURE — 72100 X-RAY EXAM L-S SPINE 2/3 VWS: CPT | Performed by: PHYSICAL MEDICINE & REHABILITATION

## 2024-10-07 RX ORDER — LIDOCAINE HYDROCHLORIDE 20 MG/ML
SOLUTION OROPHARYNGEAL
COMMUNITY
Start: 2024-08-15

## 2024-10-07 RX ORDER — OXYCODONE AND ACETAMINOPHEN 5; 325 MG/1; MG/1
1 TABLET ORAL EVERY 6 HOURS PRN
COMMUNITY
Start: 2024-06-28 | End: 2024-10-07 | Stop reason: ALTCHOICE

## 2024-10-07 RX ORDER — CLINDAMYCIN HCL 300 MG
CAPSULE ORAL
COMMUNITY
Start: 2024-07-22 | End: 2024-10-07 | Stop reason: ALTCHOICE

## 2024-10-07 RX ORDER — KETOROLAC TROMETHAMINE 30 MG/ML
30 INJECTION, SOLUTION INTRAMUSCULAR; INTRAVENOUS ONCE
Status: COMPLETED | OUTPATIENT
Start: 2024-10-07 | End: 2024-10-07

## 2024-10-07 RX ORDER — AMOXICILLIN 875 MG
875 TABLET ORAL 2 TIMES DAILY
COMMUNITY
Start: 2024-08-13 | End: 2024-10-07 | Stop reason: ALTCHOICE

## 2024-10-07 RX ORDER — HYDROCODONE BITARTRATE AND ACETAMINOPHEN 5; 325 MG/1; MG/1
TABLET ORAL
COMMUNITY
Start: 2024-07-22

## 2024-10-07 RX ORDER — PSEUDOEPHED/ACETAMINOPH/DIPHEN 30MG-500MG
1 TABLET ORAL EVERY 8 HOURS PRN
COMMUNITY
Start: 2024-08-29

## 2024-10-07 RX ADMIN — KETOROLAC TROMETHAMINE 30 MG: 30 INJECTION, SOLUTION INTRAMUSCULAR; INTRAVENOUS at 15:07

## 2024-10-07 NOTE — PATIENT INSTRUCTIONS
[facility]    Please expect an automated call within 24-48 business hours to schedule your outpatient study with Alex Muller    If you have not received an automated call, please call [phone] to speak directly with a     [facility]

## 2024-10-07 NOTE — PROGRESS NOTES
[facility]  [facility], [address]  [place] [address]  Phone: [phone]  Fax: [phone]      Patient: Yolanda Oquendo                                                                              MRN: [id number]        YOB: 1975          AGE: 48 y.o.             PCP: Vika Calhoun APRN - NP  Date:  10/07/24    Reason for Consultation back pain radiating down the left leg      HPI:  Yolanda Oquendo is a 48 y.o. female with relevant PMH of HTN who presented with left low back pain radiating down the left leg.  She reports chronic low back pain,and she developed pain radiating down her left leg she tripped over her dog.  5/15/23 she went to the ED with numbness tingling in the left leg and  left arm and had to undergo a stroke work up which was negative.   [facility] her lumbar spine . MRI at [facility] 6/1/23  diagnostic demonstrates large left disc extrusion displacing the left S1 nerve root. She tried  left L5 and S1 SNRB 7/6/2023 which has helped tremendously and she has repeated these injections  11/2/2023 and again 5/2/2024.      I had referred her to see Dr. Syed 10/2023 given the weakness in her left leg. She reports she saw him and that he ordered a new MRI of her spine but it was denied.  She states she has another appointment with him 10/22/2024.    Today she reports she fell down the stairs when her left leg gave out.  She fell on her gluteal and slid down the steps.  Her daughter had to lift her up.  She is now able to bear weight.        Neurologic symptoms: + numbness, tingling- left leg, weakness, bowel or bladder changes.  No recent falls      Location: The pain is located in the low back pain  , neck pain   Radiation: The pain does radiate down the left leg .    Pain Score: Currently:10/10   Quality: Pain is of a aching, burning, tingling, pins and needles, tight, pulling, quality.

## 2024-10-07 NOTE — PROGRESS NOTES
Yolanda Oquendo presents today for   Chief Complaint   Patient presents with    Back Pain     LUMBAR REGION       Is someone accompanying this pt? NO    Is the patient using any DME equipment during OV? NO    Depression Screening:      10/26/2023     3:31 PM 7/27/2023     1:02 PM   PHQ-9 Questionaire   Little interest or pleasure in doing things 0 0   Feeling down, depressed, or hopeless 0 0   PHQ-9 Total Score 0 0         10/26/2023     3:31 PM 7/27/2023     1:02 PM   PHQ Scores   PHQ2 Score 0 0   PHQ9 Score 0 0       Learning Assessment:  No question data found.    Abuse Screening:       No data to display                Fall Risk       No data to display                OPIOID RISK TOOL       No data to display                Coordination of Care:  1. Have you been to the ER, urgent care clinic since your last visit? NO  Hospitalized since your last visit? NO    2. Have you seen or consulted any other health care providers outside of the [facility] since your last visit? YES Include any pap smears or colon screening. [facility]

## 2024-10-07 NOTE — PROGRESS NOTES
Consent was explained to the pt and signed. No questions or concerns voiced at this time. Pt given 30mg/1ml of toradol IM in left gluteus. No sign or symptoms of infection noted at injection site. There was no bleeding, swelling or leaking noted after injection. Pt handed injection information sheet to take home. Ms. Oquendo tolerated the injection well and did not want to wait in the exam room for observation. She ambulated to check out without any issues.

## 2024-11-07 ENCOUNTER — HOSPITAL ENCOUNTER (OUTPATIENT)
Facility: HOSPITAL | Age: 49
Discharge: HOME | End: 2024-11-10
Attending: PHYSICAL MEDICINE & REHABILITATION
Payer: MEDICAID

## 2024-11-07 DIAGNOSIS — M54.16 LEFT LUMBAR RADICULOPATHY: ICD-10-CM

## 2024-11-07 PROCEDURE — 72148 MRI LUMBAR SPINE W/O DYE: CPT

## 2024-11-12 ENCOUNTER — TELEPHONE (OUTPATIENT)
Age: 49
End: 2024-11-12

## 2024-11-12 NOTE — TELEPHONE ENCOUNTER
Pt is requesting her MRI LUMBAR SPINE WO CONTRAST results taken on 11/7 be faxed to Dr. Syed's office.    Fax: [phone]    Callback # [phone]

## 2025-02-10 ENCOUNTER — OFFICE VISIT (OUTPATIENT)
Age: 50
End: 2025-02-10
Payer: MEDICAID

## 2025-02-10 ENCOUNTER — TELEPHONE (OUTPATIENT)
Age: 50
End: 2025-02-10

## 2025-02-10 VITALS
WEIGHT: 136 LBS | HEIGHT: 63 IN | BODY MASS INDEX: 24.1 KG/M2 | HEART RATE: 67 BPM | RESPIRATION RATE: 18 BRPM | SYSTOLIC BLOOD PRESSURE: 121 MMHG | OXYGEN SATURATION: 97 % | TEMPERATURE: 98.2 F | DIASTOLIC BLOOD PRESSURE: 61 MMHG

## 2025-02-10 DIAGNOSIS — R29.898 WEAKNESS OF FOOT, LEFT: ICD-10-CM

## 2025-02-10 DIAGNOSIS — M54.16 LEFT LUMBAR RADICULOPATHY: Primary | ICD-10-CM

## 2025-02-10 PROCEDURE — 99214 OFFICE O/P EST MOD 30 MIN: CPT | Performed by: PHYSICAL MEDICINE & REHABILITATION

## 2025-02-10 RX ORDER — OXYCODONE AND ACETAMINOPHEN 5; 325 MG/1; MG/1
1 TABLET ORAL EVERY 6 HOURS PRN
COMMUNITY
End: 2025-02-10 | Stop reason: SDUPTHER

## 2025-02-10 RX ORDER — DIAZEPAM 5 MG/1
TABLET ORAL
Qty: 2 TABLET | Refills: 0 | Status: SHIPPED | OUTPATIENT
Start: 2025-02-10 | End: 2025-05-11

## 2025-02-10 RX ORDER — METHOCARBAMOL 500 MG/1
500 TABLET, FILM COATED ORAL 4 TIMES DAILY
COMMUNITY
Start: 2025-02-03 | End: 2025-02-10

## 2025-02-10 RX ORDER — PREDNISONE 5 MG/1
TABLET ORAL
Qty: 1 EACH | Refills: 0 | Status: SHIPPED | OUTPATIENT
Start: 2025-02-10

## 2025-02-10 RX ORDER — OXYCODONE HYDROCHLORIDE 5 MG/1
TABLET ORAL
COMMUNITY
End: 2025-02-10

## 2025-02-10 RX ORDER — TOPIRAMATE 100 MG/1
100 TABLET, FILM COATED ORAL 2 TIMES DAILY
COMMUNITY
Start: 2024-12-24 | End: 2025-02-10

## 2025-02-10 RX ORDER — OXYCODONE AND ACETAMINOPHEN 5; 325 MG/1; MG/1
1 TABLET ORAL EVERY 8 HOURS PRN
Qty: 21 TABLET | Refills: 0 | Status: SHIPPED | OUTPATIENT
Start: 2025-02-10 | End: 2025-02-17

## 2025-02-10 RX ORDER — TRAZODONE HYDROCHLORIDE 50 MG/1
TABLET, FILM COATED ORAL
COMMUNITY
Start: 2025-01-10

## 2025-02-10 NOTE — TELEPHONE ENCOUNTER
I called and spoke to the pt. The pt was identified using 2 pt identifiers. She states that her back has flared up and she is not able to hardly move. She states that her friend is having to carry her to the bathroom. The pt was informed that she can come into the office at 1140 for an appt or can go to the ER or urgent care if she feels she cannot get to the office. The pt verbalized understanding and does not want to go to the ER or urgent care. She states that they tell her that we just need to give her medication. I explained to the pt that we cannot treat her over the phone and will need to see her. Pt was provided the office address and location.

## 2025-02-10 NOTE — PROGRESS NOTES
Yolanda Oquendo presents today for   Chief Complaint   Patient presents with    Back Problem    Pain    Back Pain       Is someone accompanying this pt? yes    Is the patient using any DME equipment during OV? Yes w/c    Depression Screening:       No data to display                Learning Assessment:  Failed to redirect to the Timeline version of the [employer] Smart[employer].    Abuse Screening:       No data to display                Fall Risk  Failed to redirect to the Timeline version of the [employer].    OPIOID RISK TOOL  Failed to redirect to the Timeline version of the [employer] SmartLink.    Coordination of Care:  1. Have you been to the ER, urgent care clinic since your last visit? Yes pain  Hospitalized since your last visit? no    2. Have you seen or consulted any other health care providers outside of the [facility] since your last visit? no Include any pap smears or colon screening. no

## 2025-02-10 NOTE — PROGRESS NOTES
[facility]    [facility], [address]  [place] [address]  Phone: [phone]  Fax: [phone]        Yolanda Oquendo  : 1975  PCP: Vika Calhoun, ALEJANDRA - NP    PROGRESS NOTE      ASSESSMENT AND PLAN    Yolanda was seen today for back problem, pain and back pain.    Diagnoses and all orders for this visit:    Left lumbar radiculopathy  -     EMG ONE EXTREMITY LOWER LT; Future  -     NCV/LAT MOTOR PER NERVE LOW/LT; Future  -     predniSONE 5 MG (48) TBPK; Take as directed w/food. Do not combine w/NSAIDS.  -     oxyCODONE-acetaminophen (PERCOCET) 5-325 MG per tablet; Take 1 tablet by mouth every 8 hours as needed for Pain for up to 7 days. Intended supply: 7 days. Take lowest dose possible to manage pain Max Daily Amount: 3 tablets  -     Ambulatory Referral to Spine Injection  -     diazePAM (VALIUM) 5 MG tablet; Take medication and hand to procedure nurse.  Did not take this medication at home.  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  Symptoms have been manageable with periodic nerve root blocks.  Progression several months ago prompting new MRI and surgical referral.  No surgery recommended.  Does well with periodic nerve root blocks.  Chronic stable left dorsiflexion weakness.  Rx prednisone 5mg- 12 day pack  Offered Toradol injection. Pt declined at this time.  Reports no benefit in past.  LLE EMG for evaluation of numbness/tingling in left leg.  Order repeat left L5 and S1 SNRB  Referral to PT for lumbar pain. Patient given paper with referral and phone number.   Acute pain Rx Percocet 5-325mg Q6h x 7 days. Advised pt no refills would be provided.  Out of work for today and tomorrow.  Return to Work note with restrictions: no repetitive bending at the waist.  She has failed NSAIDs, numerous muscle

## 2025-02-10 NOTE — TELEPHONE ENCOUNTER
Patient is requesting a call back because she wants to know what can she do to help her with her pain because she is unable to work because she is unable to walk due to her pain.    Patient last saw Dr. Neil 10/7/2024    Patient is scheduled for an office visit on 2/14/2025.    Patient tel [phone]

## 2025-02-21 ENCOUNTER — TELEPHONE (OUTPATIENT)
Age: 50
End: 2025-02-21

## 2025-02-21 NOTE — TELEPHONE ENCOUNTER
Patient called. She is having severe numbness in her foot. It has spread from just her toes to her foot and it feels like it is moving further up towards her ankle. The numbness and tingling is constant and it is making her gait unstable and painful to step on. She states her back feels a little better but her foot is so much worse and she is concerned, especially since she fell. She is using a walker at home but cannot do so at work. She is asking what her options are as she has not heard anything back about the injections, transferred her through to [facility] .    Please review and advise patient, [phone]

## 2025-02-21 NOTE — TELEPHONE ENCOUNTER
I called the patient and she states her left  foot is getting worse with the numbness and it makes her gait off where she is limping and a fall risk.  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  Her work is wanting her to go on STD.   I will send a message to the block  to get the block scheduled as soon as possible.   I will speak with Dr Shaw about her thoughts on the STD.

## 2025-02-28 ENCOUNTER — TELEPHONE (OUTPATIENT)
Age: 50
End: 2025-02-28

## 2025-02-28 NOTE — TELEPHONE ENCOUNTER
Attempted to contact Hannah regarding her message. She was not able to be reached. A message was left for her with the requested information and Dr. Shaw's response. The office number was provided in case she has other questions.

## 2025-02-28 NOTE — TELEPHONE ENCOUNTER
Hannah from [employer] called to verify if pt is out of work beginning 2/27/2025.    Please return call and advise.    callback # [phone] direct line

## 2025-02-28 NOTE — TELEPHONE ENCOUNTER
Patient is OOW due to LBP, leg weakness/numbness, and falls. Injection is on 3/11/25. Please see if we can move up her EMG/NCV.

## 2025-03-04 NOTE — TELEPHONE ENCOUNTER
Attempted to contact the pt regarding an earlier appt for the EMG. There was availability for this Friday The pt declined and states that she is positive for the flu right now and declined. Pt aware that we will try to look for appts next week.

## 2025-03-10 ENCOUNTER — TELEPHONE (OUTPATIENT)
Age: 50
End: 2025-03-10

## 2025-03-10 NOTE — TELEPHONE ENCOUNTER
Patient states she would prefer going to [facility] on [address] in [place] to do her PT. Please fax order and notes.    Patient can be reached at [phone].

## 2025-03-14 ENCOUNTER — PROCEDURE VISIT (OUTPATIENT)
Age: 50
End: 2025-03-14

## 2025-03-14 VITALS
BODY MASS INDEX: 25.41 KG/M2 | WEIGHT: 143.4 LBS | RESPIRATION RATE: 16 BRPM | DIASTOLIC BLOOD PRESSURE: 79 MMHG | TEMPERATURE: 97.2 F | HEART RATE: 61 BPM | HEIGHT: 63 IN | SYSTOLIC BLOOD PRESSURE: 118 MMHG

## 2025-03-14 DIAGNOSIS — R20.0 NUMBNESS AND TINGLING OF LEFT LOWER EXTREMITY: Primary | ICD-10-CM

## 2025-03-14 DIAGNOSIS — R20.2 NUMBNESS AND TINGLING OF LEFT LOWER EXTREMITY: Primary | ICD-10-CM

## 2025-03-14 RX ORDER — AMLODIPINE BESYLATE 10 MG/1
TABLET ORAL
COMMUNITY

## 2025-03-14 RX ORDER — CHOLECALCIFEROL (VITAMIN D3) 50 MCG
1 TABLET ORAL
COMMUNITY
Start: 2025-02-08 | End: 2025-05-09

## 2025-03-14 NOTE — PROGRESS NOTES
[facility]  [address] [place] [address], [address]  [place] [address]  Phone: [phone]  Fax: [phone]    Yolanda Oquendo  : 1975  PCP: Vika Calhoun, APRN - NP  3/14/2025    ELECTROMYOGRAPHY AND NERVE CONDUCTION STUDIES    Yolanda Oquendo was referred by Dr. Shaw for electrodiagnostic evaluation of numbness/tingling of left leg    NCV & EMG Findings:  All nerve conduction studies (as indicated in the following tables) were within normal limits.    All examined muscles (as indicated in the following table) showed no evidence of electrical instability     INTERPRETATION  This was a normal nerve conduction and EMG study showing there to be no signs of neuropathy, myopathy, or radiculopathy in the nerves and muscles tested.         CLINICAL INTERPRETATION  The electrodiagnostic findings do not appear to explain her left leg symptoms.       HISTORY OF PRESENT ILLNESS  Yolanda Oquendo is a 49 y.o. female.    Pt presents today with LLE EMG evaluation for numbness/tingling of left leg.    PAST MEDICAL HISTORY   History reviewed. No pertinent past medical history.    History reviewed. No pertinent surgical history.    MEDICATIONS    Current Outpatient Medications   Medication Sig Dispense Refill    amLODIPine (NORVASC) 10 MG tablet       Cholecalciferol (VITAMIN D3) 50 MCG (2000 UT) TABS Take 1 tablet by mouth      amLODIPine (NORVASC) 5 MG tablet Take 1 tablet by mouth daily      traZODone (DESYREL) 50 MG tablet TAKE 1 TABLET  BY MOUTH ONCE A DAY AT BEDTIME AS NEEDED (Patient not taking: Reported on 2/10/2025)      predniSONE 5 MG (48) TBPK Take as directed w/food. Do not combine w/NSAIDS. 1 each 0    diazePAM (VALIUM) 5 MG tablet Take medication and hand to procedure nurse.  Did not take this medication at home. 2 tablet 0    Cholecalciferol (VITAMIN D3) 1.25 MG (15970 UT) CAPS TAKE 1 CAPSULE BY MOUTH ONE TIME PER WEEK for 84 (Patient not taking: Reported on

## 2025-03-23 SDOH — HEALTH STABILITY: PHYSICAL HEALTH: ON AVERAGE, HOW MANY DAYS PER WEEK DO YOU ENGAGE IN MODERATE TO STRENUOUS EXERCISE (LIKE A BRISK WALK)?: 0 DAYS

## 2025-03-24 ENCOUNTER — OFFICE VISIT (OUTPATIENT)
Age: 50
End: 2025-03-24
Payer: MEDICAID

## 2025-03-24 ENCOUNTER — CLINICAL DOCUMENTATION (OUTPATIENT)
Age: 50
End: 2025-03-24

## 2025-03-24 DIAGNOSIS — R29.898 WEAKNESS OF FOOT, LEFT: Primary | ICD-10-CM

## 2025-03-24 DIAGNOSIS — R25.1 TREMORS OF NERVOUS SYSTEM: ICD-10-CM

## 2025-03-24 DIAGNOSIS — M25.372 INSTABILITY OF ANKLE, LEFT: ICD-10-CM

## 2025-03-24 DIAGNOSIS — R29.898 WEAKNESS OF FOOT, LEFT: ICD-10-CM

## 2025-03-24 DIAGNOSIS — M79.643 PAIN OF HAND, UNSPECIFIED LATERALITY: ICD-10-CM

## 2025-03-24 DIAGNOSIS — M54.50 LUMBAR PAIN: ICD-10-CM

## 2025-03-24 PROCEDURE — 99214 OFFICE O/P EST MOD 30 MIN: CPT | Performed by: ORTHOPAEDIC SURGERY

## 2025-03-24 PROCEDURE — 73630 X-RAY EXAM OF FOOT: CPT | Performed by: ORTHOPAEDIC SURGERY

## 2025-03-24 NOTE — PROGRESS NOTES
AMBULATORY PROGRESS NOTE      Patient: Yolanda Oquendo             MRN: [id number]     SSN: [id number] There is no height or weight on file to calculate BMI.  YOB: 1975     AGE: 49 y.o.       EX: female    PCP: Vika Calhoun APRN - NP       IMPRESSION //  DIAGNOSIS AND TREATMENT PLAN      Yolanda Oquendo has a diagnosis of:      Patient has seen Dr. Syed in the past, for lumbar spinal stenosis, disc herniation with disc extrusion, L4-L5.  She reports having continued weakness and numbness to her left leg and left foot.  She also had a injury to her left fourth toe        DIAGNOSES    1. Weakness of foot, left    2. Instability of ankle, left    3. Tremors of nervous system    4. Pain of hand, unspecified laterality    5. Lumbar pain          PLAN:    1. Order blood work for muscular and autoimmune disorder tests: Inflammatory blood work to be done only due to her muscle pains generalized muscle pains  2. Referral to Dr. Syed reassess her for her left greater than right leg weakness  3. Provided left AS/AC ankle brace: To give her ankle some mild mechanical support, but she is having weakness to her quad muscles on the left  4.  I have her see Dr. Domínguez, due to her having pain discomfort in her left and right thumb CMC regions    RTO after blood work     Orders Placed This Encounter    [59034] Foot Min 3V     Are you Pregnant?:   No    CBC with Auto Differential     Standing Status:   Future     Expected Date:   3/24/2025     Expiration Date:   3/24/2026    Comprehensive Metabolic Panel     Standing Status:   Future     Expected Date:   3/24/2025     Expiration Date:   3/24/2026    Sedimentation Rate     Standing Status:   Future     Expected Date:   3/24/2025     Expiration Date:   3/24/2026    C-Reactive Protein     Standing Status:   Future     Expected Date:   3/24/2025     Expiration Date:   3/24/2026    PETTY by IFA w/Reflex     Standing Status:   Future     Expected

## 2025-03-26 ENCOUNTER — SCHEDULED TELEPHONE ENCOUNTER (OUTPATIENT)
Age: 50
End: 2025-03-26
Payer: MEDICAID

## 2025-03-26 DIAGNOSIS — M54.50 LUMBAR PAIN: ICD-10-CM

## 2025-03-26 DIAGNOSIS — M54.16 LEFT LUMBAR RADICULOPATHY: Primary | ICD-10-CM

## 2025-03-26 PROCEDURE — 99214 OFFICE O/P EST MOD 30 MIN: CPT | Performed by: NURSE PRACTITIONER

## 2025-03-26 NOTE — PROGRESS NOTES
Yolanda Oquendo is a 49 y.o. female evaluated via telephone on 3/26/2025 for Back Problem, Pain, and Back Pain  .      History of Present Illness: The patient is a 49-year-old female who has a disc herniation that gives her back and leg pain.  She also has chronic stable left dorsiflexion weakness.  She underwent a left L5-S1 selective nerve root block on March 11, 2025 which did help to the point she can ambulate new.  Dr. Shaw took her out of work on February 10 and she is continuing to be out of work until her follow-up in May.  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    Physical Exam: Patient is a 49-year-old female who is alert and oriented.  She spoke with fluency.  She did not appear to be in distress.      Assessment/Plan:.  This is a patient who has disc herniation with back pain and leg pain.  We have filled out the form for her.  Dr. Shaw will sign it and we will fax it to the company.  Dr. Shaw is currently off this week and on medical leave until May 1 but we will try to get her to come by and sign the form.  We will see the patient back with Dr. Shaw on May 22, 2025 for reevaluation.    Yolanda was seen today for back problem, pain and back pain.    Diagnoses and all orders for this visit:    Left lumbar radiculopathy    Lumbar pain          Documentation:  I communicated with the patient and/or health care decision maker about back and leg pain.   Details of this discussion including any medical advice provided: Yes    Total Time: 2:52 p.m. to 3:22 PM   30 minutes    Yolanda Oquendo was evaluated through a synchronous (real-time) audio encounter. Patient identification was verified at the start of the visit. She (or guardian if applicable) is aware that this is a billable service, which includes applicable co-pays. This visit was conducted with the patient's (and/or legal

## 2025-03-26 NOTE — PROGRESS NOTES
Yolanda Oquendo presents today for   Chief Complaint   Patient presents with    Back Problem    Pain    Back Pain       Is someone accompanying this pt? no    Is the patient using any DME equipment during OV? no    Depression Screening:       No data to display                Learning Assessment:  Failed to redirect to the Timeline version of the [employer] SmartLink.    Abuse Screening:       No data to display                Fall Risk  Failed to redirect to the Timeline version of the [employer] SmartLink.    OPIOID RISK TOOL  Failed to redirect to the Timeline version of the [employer] SmartLink.    Coordination of Care:  1. Have you been to the ER, urgent care clinic since your last visit? no  Hospitalized since your last visit? no    2. Have you seen or consulted any other health care providers outside of the [facility] since your last visit? no Include any pap smears or colon screening. no

## 2025-04-29 ENCOUNTER — HOSPITAL ENCOUNTER (OUTPATIENT)
Facility: HOSPITAL | Age: 50
Setting detail: SPECIMEN
Discharge: HOME | End: 2025-05-02

## 2025-04-29 LAB — SENTARA SPECIMEN COLLECTION: NORMAL

## 2025-04-29 PROCEDURE — 99001 SPECIMEN HANDLING PT-LAB: CPT

## 2025-05-01 ENCOUNTER — TELEPHONE (OUTPATIENT)
Age: 50
End: 2025-05-01

## 2025-05-01 NOTE — TELEPHONE ENCOUNTER
Patient called concerning the lab results she can see posted in her MyChart. She states there is an abnormal value that she has questions about and she would like a phone call to discuss.    Please review and advise patient, [phone]

## 2025-05-02 ENCOUNTER — OFFICE VISIT (OUTPATIENT)
Age: 50
End: 2025-05-02
Payer: MEDICAID

## 2025-05-02 VITALS
BODY MASS INDEX: 25.52 KG/M2 | SYSTOLIC BLOOD PRESSURE: 110 MMHG | DIASTOLIC BLOOD PRESSURE: 76 MMHG | OXYGEN SATURATION: 99 % | HEART RATE: 71 BPM | WEIGHT: 144 LBS | TEMPERATURE: 98.1 F | HEIGHT: 63 IN

## 2025-05-02 DIAGNOSIS — R20.0 NUMBNESS AND TINGLING: Primary | ICD-10-CM

## 2025-05-02 DIAGNOSIS — R20.2 NUMBNESS AND TINGLING: Primary | ICD-10-CM

## 2025-05-02 DIAGNOSIS — M54.12 CERVICAL RADICULOPATHY: ICD-10-CM

## 2025-05-02 DIAGNOSIS — M54.2 NECK PAIN: Primary | ICD-10-CM

## 2025-05-02 PROCEDURE — 99214 OFFICE O/P EST MOD 30 MIN: CPT | Performed by: NURSE PRACTITIONER

## 2025-05-02 RX ORDER — OXYCODONE HYDROCHLORIDE 5 MG/1
5 TABLET ORAL EVERY 8 HOURS PRN
Qty: 21 TABLET | Refills: 0 | Status: SHIPPED | OUTPATIENT
Start: 2025-05-02 | End: 2025-05-09

## 2025-05-02 RX ORDER — OMEPRAZOLE 20 MG/1
20 CAPSULE, DELAYED RELEASE ORAL DAILY
COMMUNITY
Start: 2025-04-04

## 2025-05-02 NOTE — PROGRESS NOTES
Chief complaint   Chief Complaint   Patient presents with    Lower Back Pain    Leg Pain     left    Foot Pain     left    Neck Pain    Hand Pain     Bilateral         History of Present Illness:  Yolanda Oquendo is a  49 y.o.  female who comes in today for follow-up of her low back pain that radiates into left lower extremity.  She states the toes on her left foot have numbness and tingling.  She underwent a left L5-S1 selective nerve root block on March 11, 2025 with some relief.  She does has a disc extrusion at L5-S1 and has scheduled appointment to see Dr. Syed on June 6, 2025 as she is considering surgical intervention.  She did have a EMG of the left lower extremity done March 14 but that did come back normal.  Today she is also complaining of neck pain that started about 2 months ago without injury.  It radiates into her bilateral upper extremities left greater than right down to her hands.  She gets numbness and tingling in her hands.  She feels like she has a hard time using her hands due to the pain.  The hand pain has increased over the last 2 weeks.  She finds it very difficult to do most anything now.  She tried using a muscle relaxer and some Epsom salts but that really did not help.  Dr. Shaw had given her a small dose of oxycodone and that did seem to help a little bit.  She has tried and failed Lyrica, Elavil ibuprofen, Flexeril, Zanaflex, Tylenol and Topamax.  The ibuprofen gave her terrible GI upset and she is not able to tolerate NSAIDs.  She was not given gabapentin due to the sedative effect of that.  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  She is non-smoker.  She denies fever bowel bladder dysfunction.      Physical Exam: Patient is a 49-year-old female well-developed well-nourished who is alert and oriented with a normal mood and affect.  She has a full weightbearing nonantalgic gait.  She has normal tandem gait.  She has 4 out of 5 strength

## 2025-05-02 NOTE — PROGRESS NOTES
Yolanda Oquendo   [address] [facility] [address]    I spoke with her and told her her blood work looked fine, she no evidence of rheumatoid arthritis or psoriatic type of arthritis in her inflammatory markers look fine other than her ESR was slightly elevated at 24.  Otherwise her CCP was normal and a rheumatoid factor CBC with differential with respect to the inflammatory cell markers were normal).    She saw spine, for her hand numbness tingling shooting pain into her hands.  She states all of this started after her accident.    I did refer her to neurology as she has requested this.      Orders Placed This Encounter    External Referral To Neurology     Referral Priority:   Routine     Referral Type:   Eval and Treat     Referral Reason:   Specialty Services Required     Referred to Provider:   Doug Archer MD     Requested Specialty:   Neurology     Number of Visits Requested:   1        [facility]/pharmacy #[id number] - [place], [place] - [address] [facility] - P [phone] - F [phone]  [address] [facility] [address]  Phone: [phone] Fax: [phone]          Rohan Lui MD  5/2/2025  12:33 PM

## 2025-05-02 NOTE — PROGRESS NOTES
Yolanda Oquendo presents today for   Chief Complaint   Patient presents with    Lower Back Pain    Leg Pain     left    Foot Pain     left    Neck Pain    Hand Pain     Bilateral         Is someone accompanying this pt? no    Is the patient using any DME equipment during OV? no      Coordination of Care:  1. Have you been to the ER, urgent care clinic since your last visit? no  Hospitalized since your last visit? no    2. Have you seen or consulted any other health care providers outside of the [facility] since your last visit? Yes, ortho Include any pap smears or colon screening. no

## 2025-05-19 ENCOUNTER — TELEPHONE (OUTPATIENT)
Age: 50
End: 2025-05-19

## 2025-05-19 NOTE — TELEPHONE ENCOUNTER
She had a block done on March 11, 2025.  She would not be able to get another block until July.  She would need to have a visit within 30 days of getting the next block.

## 2025-05-19 NOTE — TELEPHONE ENCOUNTER
Patient is requesting an order to have a block injection.    Patient can be reached at [phone].

## 2025-05-20 NOTE — TELEPHONE ENCOUNTER
Called patient identified by two verifiers. Explained   She had a block done on March 11, 2025.  She would not be able to get another block until July.  She would need to have a visit within 30 days of getting the next block.   She stated she schedule to see NP in June she will then discuss it with her. Nothing further needed at this time.

## 2025-05-27 ENCOUNTER — TELEPHONE (OUTPATIENT)
Age: 50
End: 2025-05-27

## 2025-05-28 ENCOUNTER — SCHEDULED TELEPHONE ENCOUNTER (OUTPATIENT)
Age: 50
End: 2025-05-28
Payer: MEDICAID

## 2025-05-28 DIAGNOSIS — R29.898 WEAKNESS OF FOOT, LEFT: ICD-10-CM

## 2025-05-28 DIAGNOSIS — M54.16 LEFT LUMBAR RADICULOPATHY: Primary | ICD-10-CM

## 2025-05-28 DIAGNOSIS — M54.50 LUMBAR PAIN: ICD-10-CM

## 2025-05-28 PROCEDURE — 99213 OFFICE O/P EST LOW 20 MIN: CPT | Performed by: NURSE PRACTITIONER

## 2025-05-28 NOTE — PROGRESS NOTES
Yolanda Oquendo is a 49 y.o. female evaluated via telephone on 5/28/2025 for Lower Back Pain  .      History of Present Illness: The patient is a 49-year-old female who has back pain with radiating left lower extremity pain down to her foot.  She gets numbness in her foot.  She has a left foot drop.  She has a disc extrusion at L5-S1 and was seen Dr. Syed on Coral 3, 2025 in hopes of getting surgery to get this corrected.  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  This ended in February and she is trying to get benefits with the [employer].  She needs her medical statement for them filled out.    Physical Exam: The patient is a 49-year-old female who was alert and oriented.  She spoke with fluency.  She did not appear to be in distress.      Assessment/Plan: This is a patient who has back pain with left lower extremity pain.  We have filled out her form for her.  She can pick it up at the .    Yolanda was seen today for lower back pain.    Diagnoses and all orders for this visit:    Left lumbar radiculopathy    Lumbar pain    Weakness of foot, left          Documentation:  I communicated with the patient and/or health care decision maker about back and leg pain.   Details of this discussion including any medical advice provided: Yes    Total Time: 3:40 PM to 4:06 PM 26 minutes    Yolanda Oquendo was evaluated through a synchronous (real-time) audio encounter. Patient identification was verified at the start of the visit. She (or guardian if applicable) is aware that this is a billable service, which includes applicable co-pays. This visit was conducted with the patient's (and/or legal guardian's) verbal consent. She has not had a related appointment within my department in the past 7 days or scheduled within the next 24 hours.       [facility] has capacity for audio-visual visits through [employer]. Audio only visit today as patient is

## 2025-05-28 NOTE — PROGRESS NOTES
Yolanda Oquendo presents today for   Chief Complaint   Patient presents with    Lower Back Pain       Is someone accompanying this pt? no    Is the patient using any DME equipment during OV? no      Coordination of Care:  1. Have you been to the ER, urgent care clinic since your last visit? no  Hospitalized since your last visit? no    2. Have you seen or consulted any other health care providers outside of the [facility] since your last visit? Yes, Hu [facility] Include any pap smears or colon screening. no

## 2025-06-06 ENCOUNTER — TELEPHONE (OUTPATIENT)
Age: 50
End: 2025-06-06

## 2025-06-06 NOTE — TELEPHONE ENCOUNTER
Patient called. She had her appt with Dr. Syed and he is not going to do surgery at this time. The patient states she needs to return to work, so she is asking for a letter releasing her back to work full duty if possible. If possible, she would like it faxed to [phone].    Please review and advise patient, [phone]

## 2025-06-06 NOTE — TELEPHONE ENCOUNTER
Called Dr. Syed's office to get a copy of his last office note so the provider can review the note to determine if they agree with the return to work [facility] no restrictions.         Called patient [phone] and verified her name and date of birth. I inquired if she had asked Dr. Syed's office for a return to work note. I informed her that I did not see where we had taken her out of work. She stated yes we did on the form we recently did. She stated she did and he told her because she was taken out of work, we need to return her back to work. I informed her that we will need to get his office note to review due to Elodia being out of the office and she does not return until 6/17. She stated she cannot wait that long because she needs to make some money. I informed her once we receive the office note from Dr. Syed's office. The provider will review it and let us know how what to do from there. Patient verbalized understanding.

## 2025-06-11 ENCOUNTER — TELEPHONE (OUTPATIENT)
Age: 50
End: 2025-06-11

## 2025-06-11 NOTE — TELEPHONE ENCOUNTER
Encounter open due to needing authorizing provider name to be on letter. This encounter goes with the previous encounter for Dr. Shaw, No further action needed at this time.

## 2025-07-25 ENCOUNTER — OFFICE VISIT (OUTPATIENT)
Age: 50
End: 2025-07-25

## 2025-07-25 VITALS
HEIGHT: 63 IN | TEMPERATURE: 98.6 F | BODY MASS INDEX: 26.58 KG/M2 | OXYGEN SATURATION: 100 % | RESPIRATION RATE: 18 BRPM | SYSTOLIC BLOOD PRESSURE: 129 MMHG | DIASTOLIC BLOOD PRESSURE: 79 MMHG | WEIGHT: 150 LBS | HEART RATE: 61 BPM

## 2025-07-25 DIAGNOSIS — M48.062 LUMBAR STENOSIS WITH NEUROGENIC CLAUDICATION: ICD-10-CM

## 2025-07-25 DIAGNOSIS — F41.9 ANXIETY: ICD-10-CM

## 2025-07-25 DIAGNOSIS — M54.16 LEFT LUMBAR RADICULOPATHY: Primary | ICD-10-CM

## 2025-07-25 DIAGNOSIS — M51.26 HNP (HERNIATED NUCLEUS PULPOSUS), LUMBAR: ICD-10-CM

## 2025-07-25 DIAGNOSIS — M54.50 LUMBAR PAIN: ICD-10-CM

## 2025-07-25 RX ORDER — DIAZEPAM 10 MG/1
TABLET ORAL
Qty: 1 TABLET | Refills: 0 | Status: SHIPPED | OUTPATIENT
Start: 2025-07-25 | End: 2025-08-24

## 2025-07-25 NOTE — PROGRESS NOTES
Chief complaint   Chief Complaint   Patient presents with    Pain    Neck Pain    Back Problem    Back Pain       History of Present Illness:  Yolanda Oquendo is a  49 y.o.  female who comes in today requesting a repeat left L5-S1 selective nerve root block for her back pain and radiating left leg pain down to her foot.  Last time she had the block was on March 11 which gave her over 80% relief and they have her to walk and do activities better but it is worn off now and she would like to have that repeated.  Her lumbar MRI does show an HNP that impacts the nerve on the left at L5-S1.  She is currently in college to get a psychology degree and is due to graduate next August.  She denies fever bowel bladder dysfunction      Physical Exam:.  The patient is a 49-year-old female well-developed well-nourished who is alert and oriented with a normal mood and affect.  She has a full weightbearing untucked gait patient using assist device.  She has 4 out of 5 strength bilateral lower extremities.  Negative straight leg raise.  She has pain with hyperextension lumbar spine      Assessment and Plan: This is a patient who has back pain with radiating left lower extremity pain down to her foot.  I will schedule her for a left L5, S1 selective nerve root block.  I gave her the Valium to take to the block suite with her.  She will get that filled and give it to the nurse when she gets there.  We will see her back after the block.    Yolanda was seen today for pain, neck pain, back problem and back pain.    Diagnoses and all orders for this visit:    Left lumbar radiculopathy  -     Ambulatory Referral to Spine Injection    Lumbar pain  -     Ambulatory Referral to Spine Injection    HNP (herniated nucleus pulposus), lumbar  -     Ambulatory Referral to Spine Injection    Lumbar stenosis with neurogenic claudication  -     Ambulatory Referral to Spine Injection    Anxiety  -     diazePAM (VALIUM) 10 MG tablet; Take valium to the

## 2025-07-25 NOTE — PROGRESS NOTES
Yolanda Oquendo presents today for   Chief Complaint   Patient presents with    Pain    Neck Pain    Back Problem    Back Pain       Is someone accompanying this pt? no    Is the patient using any DME equipment during OV? no    Depression Screening:       No data to display                Learning Assessment:  Failed to redirect to the Timeline version of the [employer] SmartLink.    Abuse Screening:       No data to display                Fall Risk  Failed to redirect to the Timeline version of the [employer] Smart[employer].    OPIOID RISK TOOL  Failed to redirect to the Timeline version of the [employer] Smart[employer].    Coordination of Care:  1. Have you been to the ER, urgent care clinic since your last visit? no  Hospitalized since your last visit? no    2. Have you seen or consulted any other health care providers outside of the [facility] since your last visit? no Include any pap smears or colon screening. no